# Patient Record
Sex: FEMALE | Race: WHITE
[De-identification: names, ages, dates, MRNs, and addresses within clinical notes are randomized per-mention and may not be internally consistent; named-entity substitution may affect disease eponyms.]

---

## 2020-08-03 ENCOUNTER — HOSPITAL ENCOUNTER (OUTPATIENT)
Dept: HOSPITAL 62 - LC | Age: 20
Discharge: HOME | End: 2020-08-03
Attending: OBSTETRICS & GYNECOLOGY
Payer: OTHER GOVERNMENT

## 2020-08-03 DIAGNOSIS — Z34.83: ICD-10-CM

## 2020-08-03 DIAGNOSIS — Z3A.35: Primary | ICD-10-CM

## 2020-08-03 LAB
APPEARANCE UR: (no result)
APTT PPP: YELLOW S
BARBITURATES UR QL SCN: (no result)
BILIRUB UR QL STRIP: NEGATIVE
GLUCOSE UR STRIP-MCNC: NEGATIVE MG/DL
KETONES UR STRIP-MCNC: NEGATIVE MG/DL
METHADONE UR QL SCN: NEGATIVE
NITRITE UR QL STRIP: NEGATIVE
PCP UR QL SCN: NEGATIVE
PH UR STRIP: 6 [PH] (ref 5–9)
PROT UR STRIP-MCNC: 30 MG/DL
SP GR UR STRIP: 1.02
URINE AMPHETAMINES SCREEN: NEGATIVE
URINE BENZODIAZEPINES SCREEN: NEGATIVE
URINE COCAINE SCREEN: NEGATIVE
URINE MARIJUANA (THC) SCREEN: NEGATIVE
UROBILINOGEN UR-MCNC: NEGATIVE MG/DL (ref ?–2)

## 2020-08-03 PROCEDURE — 59025 FETAL NON-STRESS TEST: CPT

## 2020-08-03 PROCEDURE — 81005 URINALYSIS: CPT

## 2020-08-03 PROCEDURE — 80307 DRUG TEST PRSMV CHEM ANLYZR: CPT

## 2020-08-03 PROCEDURE — 80345 DRUG SCREENING BARBITURATES: CPT

## 2020-08-03 PROCEDURE — G0480 DRUG TEST DEF 1-7 CLASSES: HCPCS

## 2020-08-22 ENCOUNTER — HOSPITAL ENCOUNTER (OUTPATIENT)
Dept: HOSPITAL 62 - LC | Age: 20
Discharge: HOME | End: 2020-08-22
Attending: OBSTETRICS & GYNECOLOGY
Payer: OTHER GOVERNMENT

## 2020-08-22 DIAGNOSIS — O47.1: Primary | ICD-10-CM

## 2020-08-22 DIAGNOSIS — Z3A.37: ICD-10-CM

## 2020-08-22 LAB
APPEARANCE UR: CLEAR
APTT PPP: YELLOW S
BARBITURATES UR QL SCN: (no result)
BILIRUB UR QL STRIP: NEGATIVE
GLUCOSE UR STRIP-MCNC: NEGATIVE MG/DL
KETONES UR STRIP-MCNC: NEGATIVE MG/DL
METHADONE UR QL SCN: NEGATIVE
NITRITE UR QL STRIP: NEGATIVE
PCP UR QL SCN: NEGATIVE
PH UR STRIP: 7 [PH] (ref 5–9)
PROT UR STRIP-MCNC: NEGATIVE MG/DL
SP GR UR STRIP: 1.01
URINE AMPHETAMINES SCREEN: NEGATIVE
URINE BENZODIAZEPINES SCREEN: NEGATIVE
URINE COCAINE SCREEN: NEGATIVE
URINE MARIJUANA (THC) SCREEN: NEGATIVE
UROBILINOGEN UR-MCNC: NEGATIVE MG/DL (ref ?–2)

## 2020-08-22 PROCEDURE — 81005 URINALYSIS: CPT

## 2020-08-22 PROCEDURE — 84112 EVAL AMNIOTIC FLUID PROTEIN: CPT

## 2020-08-22 PROCEDURE — 59025 FETAL NON-STRESS TEST: CPT

## 2020-08-22 PROCEDURE — 80307 DRUG TEST PRSMV CHEM ANLYZR: CPT

## 2020-08-22 NOTE — NON STRESS TEST REPORT
=================================================================

Non Stress Test

=================================================================

Datetime Report Generated by CPN: 08/22/2020 15:43

   

   

=================================================================

DEMOGRAPHIC

=================================================================

   

EGA NST:  37.6

   

=================================================================

INDICATION

=================================================================

   

Indication for Study (NST) Other:  IUP at 37.6; Not if Labor

   

=================================================================

VITAL SIGNS

=================================================================

   

Temperature - NST:  98.5

Pulse - NST:  92

RESP - NST:  18

NBPSYS NST:  126

NBPDIA NST:  72

   

=================================================================

MONITORING

=================================================================

   

Monitor Explained:  Monitor Explained; Test Explained; Patient

   Verbalized Understanding

Time on Monitor:  08/22/2020 14:50

Time off Monitor:  08/22/2020 15:30

NST Duration:  40

   

=================================================================

NST INTERVENTIONS

=================================================================

   

NST Interventions:  PO Hydration

Physician Notified NST:  Dr. Wild

   

=================================================================

BABY A

=================================================================

   

Fetal Movement :  Present

Contraction Frequency :  0

FHR Baseline :  135

Accelerations :  15X15

Decelerations :  None

Variability :  Moderate 6-25bpm

NST Review:  Meets Criteria for Reactive NST

NST Review and Verified By :  FRANK Norwood RN

NST Results:  Reactive

   

=================================================================

NST REPORT

=================================================================

   

Report Trigger:  Send Report

## 2020-08-22 NOTE — NON STRESS TEST REPORT
=================================================================

Non Stress Test

=================================================================

Datetime Report Generated by CPN: 08/22/2020 14:33

   

   

=================================================================

DEMOGRAPHIC

=================================================================

   

EGA NST:  35.1

   

=================================================================

INDICATION

=================================================================

   

Indication for Study (NST) Other:  LC

   

=================================================================

MONITORING

=================================================================

   

Monitor Explained:  Monitor Explained; Test Explained; Patient

   Verbalized Understanding

Time on Monitor:  08/03/2020 23:10

Time off Monitor:  08/03/2020 23:35

NST Duration:  25

   

=================================================================

NST INTERVENTIONS

=================================================================

   

NST Interventions:  PO Hydration

Physician Notified NST:  Dr. Pierce

BABY A:  [id number]

   

=================================================================

BABY A

=================================================================

   

Fetal Movement :  Present

Contraction Frequency :  none

FHR Baseline :  125

Accelerations :  15X15

Decelerations :  None

Variability :  Moderate 6-25bpm

NST Review:  Meets Criteria for Reactive NST

NST Review and Verified By :  JUSTIN Vásquezavabrendan RN

NST Results:  Reactive

   

=================================================================

NST REPORT

=================================================================

   

Report Trigger:  Send Report

## 2020-08-24 ENCOUNTER — HOSPITAL ENCOUNTER (OUTPATIENT)
Dept: HOSPITAL 62 - LC | Age: 20
Discharge: HOME | End: 2020-08-24
Attending: OBSTETRICS & GYNECOLOGY
Payer: OTHER GOVERNMENT

## 2020-08-24 DIAGNOSIS — Z3A.38: ICD-10-CM

## 2020-08-24 DIAGNOSIS — O47.1: Primary | ICD-10-CM

## 2020-08-24 LAB
APPEARANCE UR: (no result)
APTT PPP: YELLOW S
BARBITURATES UR QL SCN: (no result)
BILIRUB UR QL STRIP: NEGATIVE
GLUCOSE UR STRIP-MCNC: NEGATIVE MG/DL
KETONES UR STRIP-MCNC: NEGATIVE MG/DL
METHADONE UR QL SCN: NEGATIVE
NITRITE UR QL STRIP: NEGATIVE
PCP UR QL SCN: NEGATIVE
PH UR STRIP: 6 [PH] (ref 5–9)
PROT UR STRIP-MCNC: NEGATIVE MG/DL
SP GR UR STRIP: 1.02
URINE AMPHETAMINES SCREEN: NEGATIVE
URINE BENZODIAZEPINES SCREEN: NEGATIVE
URINE COCAINE SCREEN: NEGATIVE
URINE MARIJUANA (THC) SCREEN: NEGATIVE
UROBILINOGEN UR-MCNC: NEGATIVE MG/DL (ref ?–2)

## 2020-08-24 PROCEDURE — 80307 DRUG TEST PRSMV CHEM ANLYZR: CPT

## 2020-08-24 PROCEDURE — 81005 URINALYSIS: CPT

## 2020-08-24 PROCEDURE — 59025 FETAL NON-STRESS TEST: CPT

## 2020-08-24 NOTE — NON STRESS TEST REPORT
=================================================================

Non Stress Test

=================================================================

Datetime Report Generated by CPN: 08/24/2020 21:26

   

   

=================================================================

DEMOGRAPHIC

=================================================================

   

EGA NST:  38.1

   

=================================================================

INDICATION

=================================================================

   

Indication for Study (NST) Other:  lc

   

=================================================================

VITAL SIGNS

=================================================================

   

Temperature - NST:  97.1

Pulse - NST:  86

RESP - NST:  17

NBPSYS NST:  119

NBPDIA NST:  77

   

=================================================================

MONITORING

=================================================================

   

Monitor Explained:  Monitor Explained; Test Explained; Patient

   Verbalized Understanding

Time on Monitor:  08/24/2020 19:15

Time off Monitor:  08/24/2020 20:50

NST Duration:  95

   

=================================================================

NST INTERVENTIONS

=================================================================

   

NST Interventions:  PO Hydration; Reposition Patient

Physician Notified NST:  Dr Pierce

BABY A:  [id number]

   

=================================================================

BABY A

=================================================================

   

Fetal Movement :  Present

Contraction Frequency :  rare

FHR Baseline :  120

Accelerations :  15X15

Decelerations :  None

Variability :  Moderate 6-25bpm

NST Review:  Meets Criteria for Reactive NST

NST Review and Verified By :  ELHAM Null RN

NST Results:  Reactive

   

=================================================================

NST REPORT

=================================================================

   

Report Trigger:  Send Report

## 2020-08-28 ENCOUNTER — HOSPITAL ENCOUNTER (OUTPATIENT)
Dept: HOSPITAL 62 - LC | Age: 20
Discharge: HOME | End: 2020-08-28
Attending: OBSTETRICS & GYNECOLOGY
Payer: OTHER GOVERNMENT

## 2020-08-28 DIAGNOSIS — O36.8130: ICD-10-CM

## 2020-08-28 DIAGNOSIS — Z3A.38: ICD-10-CM

## 2020-08-28 DIAGNOSIS — O47.1: Primary | ICD-10-CM

## 2020-08-28 LAB
APPEARANCE UR: CLEAR
APTT PPP: (no result) S
BARBITURATES UR QL SCN: (no result)
BILIRUB UR QL STRIP: NEGATIVE
GLUCOSE UR STRIP-MCNC: NEGATIVE MG/DL
KETONES UR STRIP-MCNC: NEGATIVE MG/DL
METHADONE UR QL SCN: NEGATIVE
NITRITE UR QL STRIP: NEGATIVE
PCP UR QL SCN: NEGATIVE
PH UR STRIP: 7 [PH] (ref 5–9)
PROT UR STRIP-MCNC: NEGATIVE MG/DL
SP GR UR STRIP: 1.01
URINE AMPHETAMINES SCREEN: NEGATIVE
URINE BENZODIAZEPINES SCREEN: NEGATIVE
URINE COCAINE SCREEN: NEGATIVE
URINE MARIJUANA (THC) SCREEN: NEGATIVE
UROBILINOGEN UR-MCNC: NEGATIVE MG/DL (ref ?–2)

## 2020-08-28 PROCEDURE — 81005 URINALYSIS: CPT

## 2020-08-28 PROCEDURE — 59025 FETAL NON-STRESS TEST: CPT

## 2020-08-28 PROCEDURE — 80307 DRUG TEST PRSMV CHEM ANLYZR: CPT

## 2020-09-07 ENCOUNTER — HOSPITAL ENCOUNTER (OUTPATIENT)
Dept: HOSPITAL 62 - LC | Age: 20
Discharge: HOME | End: 2020-09-07
Attending: OBSTETRICS & GYNECOLOGY
Payer: OTHER GOVERNMENT

## 2020-09-07 ENCOUNTER — HOSPITAL ENCOUNTER (INPATIENT)
Dept: HOSPITAL 62 - LC | Age: 20
LOS: 3 days | Discharge: HOME | End: 2020-09-10
Attending: OBSTETRICS & GYNECOLOGY | Admitting: OBSTETRICS & GYNECOLOGY
Payer: OTHER GOVERNMENT

## 2020-09-07 DIAGNOSIS — F32.9: ICD-10-CM

## 2020-09-07 DIAGNOSIS — O47.1: Primary | ICD-10-CM

## 2020-09-07 DIAGNOSIS — Z3A.40: ICD-10-CM

## 2020-09-07 DIAGNOSIS — F41.9: ICD-10-CM

## 2020-09-07 DIAGNOSIS — Z20.828: ICD-10-CM

## 2020-09-07 LAB
ADD MANUAL DIFF: NO
APPEARANCE UR: CLEAR
APPEARANCE UR: CLEAR
APTT PPP: YELLOW S
APTT PPP: YELLOW S
BARBITURATES UR QL SCN: (no result)
BARBITURATES UR QL SCN: (no result)
BASOPHILS # BLD AUTO: 0.1 10^3/UL (ref 0–0.2)
BASOPHILS NFR BLD AUTO: 0.7 % (ref 0–2)
BILIRUB UR QL STRIP: NEGATIVE
BILIRUB UR QL STRIP: NEGATIVE
EOSINOPHIL # BLD AUTO: 0.1 10^3/UL (ref 0–0.6)
EOSINOPHIL NFR BLD AUTO: 0.5 % (ref 0–6)
ERYTHROCYTE [DISTWIDTH] IN BLOOD BY AUTOMATED COUNT: 14 % (ref 11.5–14)
GLUCOSE UR STRIP-MCNC: NEGATIVE MG/DL
GLUCOSE UR STRIP-MCNC: NEGATIVE MG/DL
HCT VFR BLD CALC: 35.2 % (ref 36–47)
HGB BLD-MCNC: 12.2 G/DL (ref 12–15.5)
KETONES UR STRIP-MCNC: NEGATIVE MG/DL
KETONES UR STRIP-MCNC: NEGATIVE MG/DL
LYMPHOCYTES # BLD AUTO: 1.3 10^3/UL (ref 0.5–4.7)
LYMPHOCYTES NFR BLD AUTO: 10.3 % (ref 13–45)
MCH RBC QN AUTO: 28.2 PG (ref 27–33.4)
MCHC RBC AUTO-ENTMCNC: 34.6 G/DL (ref 32–36)
MCV RBC AUTO: 82 FL (ref 80–97)
METHADONE UR QL SCN: NEGATIVE
METHADONE UR QL SCN: NEGATIVE
MONOCYTES # BLD AUTO: 1.1 10^3/UL (ref 0.1–1.4)
MONOCYTES NFR BLD AUTO: 8.4 % (ref 3–13)
NEUTROPHILS # BLD AUTO: 10.1 10^3/UL (ref 1.7–8.2)
NEUTS SEG NFR BLD AUTO: 80.1 % (ref 42–78)
NITRITE UR QL STRIP: NEGATIVE
NITRITE UR QL STRIP: NEGATIVE
PCP UR QL SCN: NEGATIVE
PCP UR QL SCN: NEGATIVE
PH UR STRIP: 7 [PH] (ref 5–9)
PH UR STRIP: 7 [PH] (ref 5–9)
PLATELET # BLD: 231 10^3/UL (ref 150–450)
PROT UR STRIP-MCNC: NEGATIVE MG/DL
PROT UR STRIP-MCNC: NEGATIVE MG/DL
RBC # BLD AUTO: 4.31 10^6/UL (ref 3.72–5.28)
SP GR UR STRIP: 1.01
SP GR UR STRIP: 1.01
TOTAL CELLS COUNTED % (AUTO): 100 %
URINE AMPHETAMINES SCREEN: NEGATIVE
URINE AMPHETAMINES SCREEN: NEGATIVE
URINE BENZODIAZEPINES SCREEN: NEGATIVE
URINE BENZODIAZEPINES SCREEN: NEGATIVE
URINE COCAINE SCREEN: NEGATIVE
URINE COCAINE SCREEN: NEGATIVE
URINE MARIJUANA (THC) SCREEN: NEGATIVE
URINE MARIJUANA (THC) SCREEN: NEGATIVE
UROBILINOGEN UR-MCNC: NEGATIVE MG/DL (ref ?–2)
UROBILINOGEN UR-MCNC: NEGATIVE MG/DL (ref ?–2)
WBC # BLD AUTO: 12.6 10^3/UL (ref 4–10.5)

## 2020-09-07 PROCEDURE — 86901 BLOOD TYPING SEROLOGIC RH(D): CPT

## 2020-09-07 PROCEDURE — 81005 URINALYSIS: CPT

## 2020-09-07 PROCEDURE — 86900 BLOOD TYPING SEROLOGIC ABO: CPT

## 2020-09-07 PROCEDURE — 85027 COMPLETE CBC AUTOMATED: CPT

## 2020-09-07 PROCEDURE — 86850 RBC ANTIBODY SCREEN: CPT

## 2020-09-07 PROCEDURE — 36415 COLL VENOUS BLD VENIPUNCTURE: CPT

## 2020-09-07 PROCEDURE — 85025 COMPLETE CBC W/AUTO DIFF WBC: CPT

## 2020-09-07 PROCEDURE — 80307 DRUG TEST PRSMV CHEM ANLYZR: CPT

## 2020-09-07 PROCEDURE — 86592 SYPHILIS TEST NON-TREP QUAL: CPT

## 2020-09-07 PROCEDURE — 84112 EVAL AMNIOTIC FLUID PROTEIN: CPT

## 2020-09-07 PROCEDURE — 59025 FETAL NON-STRESS TEST: CPT

## 2020-09-07 NOTE — NON STRESS TEST REPORT
=================================================================

Non Stress Test

=================================================================

Datetime Report Generated by CPN: 09/07/2020 09:04

   

   

=================================================================

DEMOGRAPHIC

=================================================================

   

EGA NST:  40.1

   

=================================================================

INDICATION

=================================================================

   

Indication for Study (NST) Other:  false labor

   

=================================================================

MONITORING

=================================================================

   

Monitor Explained:  Monitor Explained; Test Explained; Patient

   Verbalized Understanding

Time on Monitor:  09/07/2020 07:46

Time off Monitor:  09/07/2020 08:54

   

=================================================================

NST INTERVENTIONS

=================================================================

   

NST Interventions:  PO Hydration; Reposition Patient

Physician Notified NST:  Dr. Torres

   

=================================================================

BABY A

=================================================================

   

Fetal Movement :  Present

Contraction Frequency :  none

FHR Baseline :  135

Accelerations :  15X15

Decelerations :  None

Variability :  Moderate 6-25bpm

NST Review:  Meets Criteria for Reactive NST

NST Review and Verified By :  RICK Wang

NST Results:  Reactive

   

=================================================================

NST REPORT

=================================================================

   

Report Trigger:  Send Report

## 2020-09-07 NOTE — NON STRESS TEST REPORT
=================================================================

Non Stress Test

=================================================================

Datetime Report Generated by CPN: 09/07/2020 07:32

   

   

=================================================================

DEMOGRAPHIC

=================================================================

   

EGA NST:  38.5

   

=================================================================

INDICATION

=================================================================

   

Indication for Study (NST) Other:  gestational age >32 weeks

   

=================================================================

VITAL SIGNS

=================================================================

   

Temperature - NST:  98.3

Pulse - NST:  73

RESP - NST:  17

NBPSYS NST:  120

NBPDIA NST:  77

   

=================================================================

MONITORING

=================================================================

   

Monitor Explained:  Monitor Explained; Test Explained; Patient

   Verbalized Understanding

Time on Monitor:  08/28/2020 20:37

Time off Monitor:  08/28/2020 22:21

   

=================================================================

NST INTERVENTIONS

=================================================================

   

NST Interventions:  PO Hydration; Reposition Patient

Physician Notified NST:  dr harrison

BABY A:  [id number]

   

=================================================================

BABY A

=================================================================

   

Fetal Movement :  Present

Contraction Frequency :  irregular

FHR Baseline :  125

Accelerations :  15X15

Decelerations :  None

Variability :  Moderate 6-25bpm

NST Review:  Meets Criteria for Reactive NST

NST Review and Verified By :  RICK BUSTAMANTE Results:  Reactive

   

=================================================================

NST REPORT

=================================================================

   

Report Trigger:  Send Report

## 2020-09-07 NOTE — ADMISSION PHYSICAL
=================================================================



=================================================================

Datetime Report Generated by CPN: 2020 17:11

   

   

=================================================================

CURRENT ADMISSION

=================================================================

   

Chief Complaint:  Uterine Contractions; Suspected Ruptured Membranes

Indication for Induction:  Not Applicable

Admit Impression :  Term, Intrauterine Pregnancy

Admit Plan:  Admit to Unit; Initiate Labor Protocol

   

=================================================================

ALLERGIES

=================================================================

   

Medication Allergies:  No

Medication Allergies:  No Known Allergies (2020)

Latex:  No Latex Allergies

   

=================================================================

OBSTETRICAL HISTORY

=================================================================

   

EDC:  2020 00:00

:  3

Para:  0

Term:  0

:  0

SAB:  2

IAB:  0

Ectopic:  0

Livin

Cesareans:  0

VBACs:  0

Multiple Births:  0

Gestational Diabetes:  No

Rh Sensitization:  No

Incompetent Cervix:  No

REMIGIO:  No

Infertility:  No

ART Treatment:  No

Uterine Anomaly:  No

IUGR:  No

Hx Previous C/S:  No

Macrosomia:  No

Hx Loss/Stillborn:  No

PIH:  No

Hx  Death:  No

Placenta Previa/Abruption:  No

Depression/PP Depression:  Yes

PTL/PROM:  No

Post Partum Hemorrhage:  No

Current Pregnancy Procedures:  Ultrasound; NST

Obstetrical History Comments:  G1-SAB

   G2-SAB

   G3-Current

   

=================================================================

***SEE PRENATAL RECORDS***

=================================================================

   

Alcohol:  No

Marijuana :  No

Cocaine:  No

Other Illicit Drugs:  No

Cigarettes:  Never Smoker. [id number]

   

=================================================================

MEDICAL HISTORY

=================================================================

   

Diabetes:  No

Blood Transfusion:  No

Pulmonary Disease (Asthma, TB):  No

Breast Disease:  No

Hypertension:  No

Gyn Surgery:  No

Heart Disease:  No

Hosp/Surgery:  Yes

Autoimmune Disorder:  No

Anesthetic Complications:  No

Kidney Disease:  Yes

Abnormal Pap Smear:  No

Neuro/Epilepsy:  No

Psychiatric Disorders:  Yes

Other Medical Diseases:  No

Hepatitis/Liver Disease:  No

Significant Family History:  No

Varicosities/Phlebitis:  No

Trauma/Violence :  No

Thyroid Dysfunction:  No

Medical History Comments:  Right foot surgery 2019; Depression/Anxiety

   

=================================================================

INFECTIOUS HISTORY

=================================================================

   

Gonorrhea:  No

Genital Herpes:  No

Chlamydia:  No

Tuberculosis:  No

Syphilis:  No

Hepatitis:  No

HIV/AIDS Exposure:  No

Rash or Viral Illness:  No

HPV:  No

   

=================================================================

PHYSICAL EXAM

=================================================================

   

General:  Normal

HEENT:  Normal

Neurologic:  Normal

Thyroid:  Normal

Heart:  Normal

Lungs:  Normal

Breast:  Deferred

Back:  Normal

Abdomen:  Normal

Genitourinary Exam:  Normal

Extremities:  Normal

DTRs:  Normal

Pelvic Type:  Adequate

   

=================================================================

FETUS A

=================================================================

   

EGA:  40.1

   

=================================================================

PLANS FOR LABOR AND DELIVERY

=================================================================

   

Labor and Delivery:  None

Pain Management:  Epidural

Feeding Preference:  Breast

Circumcision:  Yes

   

=================================================================

INFORMED CONSENT

=================================================================

   

Signature:  Electronically signed by Michael Torres MD ([facility]) on

   2020 at 17:11  with User ID: [email or web address]

## 2020-09-08 PROCEDURE — 0UQGXZZ REPAIR VAGINA, EXTERNAL APPROACH: ICD-10-PCS | Performed by: OBSTETRICS & GYNECOLOGY

## 2020-09-08 RX ADMIN — DOCUSATE SODIUM SCH MG: 100 CAPSULE, LIQUID FILLED ORAL at 09:26

## 2020-09-08 RX ADMIN — IBUPROFEN SCH MG: 800 TABLET, FILM COATED ORAL at 21:19

## 2020-09-08 RX ADMIN — FAMOTIDINE SCH MG: 20 TABLET, FILM COATED ORAL at 09:26

## 2020-09-08 RX ADMIN — ACETAMINOPHEN AND CODEINE PHOSPHATE PRN EACH: 300; 30 TABLET ORAL at 04:06

## 2020-09-08 RX ADMIN — FERROUS SULFATE TAB 325 MG (65 MG ELEMENTAL FE) SCH MG: 325 (65 FE) TAB at 18:14

## 2020-09-08 RX ADMIN — SENNOSIDES, DOCUSATE SODIUM SCH EACH: 50; 8.6 TABLET, FILM COATED ORAL at 09:26

## 2020-09-08 RX ADMIN — FAMOTIDINE SCH MG: 20 TABLET, FILM COATED ORAL at 21:19

## 2020-09-08 RX ADMIN — Medication SCH CAP: at 09:26

## 2020-09-08 RX ADMIN — FERROUS SULFATE TAB 325 MG (65 MG ELEMENTAL FE) SCH MG: 325 (65 FE) TAB at 09:26

## 2020-09-08 RX ADMIN — DOCUSATE SODIUM SCH MG: 100 CAPSULE, LIQUID FILLED ORAL at 18:14

## 2020-09-08 RX ADMIN — IBUPROFEN SCH MG: 800 TABLET, FILM COATED ORAL at 06:18

## 2020-09-08 RX ADMIN — IBUPROFEN SCH MG: 800 TABLET, FILM COATED ORAL at 13:08

## 2020-09-08 RX ADMIN — ACETAMINOPHEN AND CODEINE PHOSPHATE PRN EACH: 300; 30 TABLET ORAL at 18:14

## 2020-09-08 NOTE — DELIVERY SUMMARY
=================================================================

Del Sum A-C

=================================================================

Datetime Report Generated by CPN: 2020 04:37

   

   

=================================================================

DELIVERY PERSONNEL

=================================================================

   

DELIVERY PERSONNEL:  [id number]

Delivery Doctor::  Michael Torres MD

Labor and Delivery Nurse::  Kalyani Chery, RNC

Labor and Delivery Nurse::  Mindy Espinoza RN

Nursery Nurse::  Maria Dambrocio Merino RN

   

=================================================================

MATERNAL INFORMATION

=================================================================

   

Delivery Anesthesia:  Epidural

Medications After Delivery:  Pitocin 30 Units in 500ml NS/D5W

Delivery QBL Comment:  100 mL

Maternal Complications:  None

   

=================================================================

LABOR SUMMARY

=================================================================

   

EDC:  2020 00:00

No. Babies in Womb:  1

 Attempted:  No

Labor Anesthesia:  Epidural

   

=================================================================

LABOR INFORMATION

=================================================================

   

Reason for Induction:  Not Applicable

Onset of Labor:  2020 21:02

Complete Dilatation:  2020 01:37

Oxytocin:  N/A

Group B Beta Strep:  negative

Antibiotics # of Doses:  n/a

Name of Antibiotic Given:  n/a

Steroids Given:  None

Reason Steroids Not Administered:  Not Applicable

   

=================================================================

MEMBRANES

=================================================================

   

Membranes Rupture Method:  Spontaneous

Rupture of Membranes:  2020 03:00

Length of Rupture (hr):  24.32

Amniotic Fluid Color:  Moderate Meconium

Amniotic Fluid Amount:  Moderate

Amniotic Fluid Odor:  Normal

   

=================================================================

STAGES OF LABOR

=================================================================

   

Stage 1 hr:  4

Stage 1 min:  35

Stage 2 hr:  1

Stage 2 min:  42

Stage 3 hr:  0

Stage 3 min:  3

Total Time in Labor hr:  6

Total Time in Labor min:  20

   

=================================================================

VAGINAL DELIVERY

=================================================================

   

Episiotomy:  None

Laceration #1:  Vaginal

Laceration Extension #1:  N/A

Laceration Repair:  Yes

Sponge Count Correct:  Yes

Sharps Count Correct:  Yes

   

=================================================================

BABY A INFORMATION

=================================================================

   

Infant Delivery Date/Time:  2020 03:19

Method of Delivery:  Vaginal

Nurse Controlled Delivery:  No

Born in Route :  No

:  N/A

Forceps:  N/A

Vacuum Extraction:  N/A

Shoulder Dystocia :  No

   

=================================================================

PRESENTATION/POSITION BABY A

=================================================================

   

Presentation:  Cephalic

Cephalic Presentation:  Vertex

Vertex Position:  Left Occipital Anterior

Breech Presentation:  N/A

   

=================================================================

PLACENTA INFORMATION BABY A

=================================================================

   

Placenta Delivery Time :  2020 03:22

Placenta Method of Delivery:  Spontaneous

Placenta Status:  Delivered

   

=================================================================

APGAR SCORES BABY A

=================================================================

   

Heart Rate 1 min:  >100 bpm

Resp Effort 1 min:  Good Cry

Reflex Irritability 1 min:  Cough or Sneeze or Pulls Away

Muscle Tone 1 min:  Active Motion

Color 1 min:  Blue/Pale

APGAR SCORE 1 MIN:  8

Heart Rate 5 min:  >100 bpm

Resp Effort 5 min:  Good Cry

Reflex Irritability 5 min:  Cough or Sneeze or Pulls Away

Muscle Tone 5 min:  Active Motion

Color 5 min:  Body Pink, Extremities Blue

APGAR SCORE 5 MIN:  9

   

=================================================================

INFANT INFORMATION BABY A

=================================================================

   

Gestational Age at Delivery:  40.2

Gestational Status:  Full Term- 39- 40.6 Weeks

Infant Outcome :  Liveborn

Infant Condition :  Stable

Infant Sex:  Male

   

=================================================================

IDENTIFICATION BABY A

=================================================================

   

Infant Verification Date/Time:  2020 03:29

ID Band Number:  [id number]

Mother's Name Verified:  Yes

Infant Medical Record Number:  [id number]

RN Verifying Infant:  ELHAM Null RN

Additional Verifying Personnel:  DARREL Haynes

   

=================================================================

WEIGHT/LENGTH BABY A

=================================================================

   

Infant Birthweight (gm):  3260

Infant Weight (lb):  7

Infant Weight (oz):  3

Infant Length (in):  19.00

Infant Length (cm):  48.26

   

=================================================================

CORD INFORMATION BABY A

=================================================================

   

No. Cord Vessels:  3

Nuchal Cord :  Around Neck x1, Loose

Cord Blood Taken:  Yes-For Eval (Mom's Blood Type - or O+)

Infant Suction:  Mouth; Nose

   

=================================================================

ASSESSMENT BABY A

=================================================================

   

Infant Complications:  None

Physical Findings at Delivery:  Within Normal Limits

Skin to Skin:  Yes

Transferred To:  Remains with Mother

   

=================================================================

BABY B INFORMATION

=================================================================

   

 :  N/A

   

=================================================================

SIGNATURES

=================================================================

   

Signature:  Electronically signed by Michael Torres MD ([email or web address]) on

   2020 at 03:28  with User ID: [email or web address]

## 2020-09-08 NOTE — BIRTH CERTIFICATE DATA
=================================================================

Birth Cert Data

=================================================================

Datetime Report Generated by COURTNEY: 2020 04:37

   

   

=================================================================

BIRTH CERTIFICATE DATA

=================================================================

   

 47a. Prenatal Care:  Yes    (2020 00:25:Veronica Chinchilla RN)

 47b. Date of First Visit:  2020 00:00    (2020 00:25:Veronica Chinchilla RN)

 47c. Date of Last Visit:  2020 00:00    (2020 00:25:Veronica Chinchilla RN)

 47d. Number of Prenatal Visits:  10    (2020 00:25:Veronica Chinchilla RN)

 48a. Number of Prev Live Births:  0    (2020 00:25:Veronica Chinchilla RN)

 48b. Now Livin    (2020 00:25:Veronica Chinchilla RN)

 48c. Live Births Now Dead:  0    (2020 00:25: system process)

 48e. Pregnancy Losses:  2    (2020 00:25:Veronica Chinchilla RN)

 48f. Date of Last Preg Loss:  2019 00:00    (2020

   00:25:April Renée RN)

   

=================================================================

RISK FACTORS IN THIS PREGNANCY

=================================================================

   

 49a. Diabetes:  No    (2020 00:25:April Renée RN)

 49b. Hypertension:  No    (2020 00:25:April Renée RN)

 49c. Previous  Births:  0    (2020 00:25:Veronica Chinchilla RN)

 49d. Stillborns:  No    (2020 00:25:April Renée RN)

 49d. IUGR:  No    (2020 00:25:April Renée RN)

 49e. Infertility Treatment:  No    (2020 00:25:April Renée RN)

 49f. Previous Cesareans:  0    (2020 00:25:April Renée RN)

   

=================================================================

Mother's Height

=================================================================

   

 50b. Height Inches:  69    (2020 16:20:QS system process)

   

=================================================================

Mother's Weight 

=================================================================

   

 51a. Pre-Pregnancy Weight (lbs):  220    (2020 00:25:April

   RICK Chinchilla)

 51b. Weight at Delivery (lbs):  249    (2020 07:47:QS system

   process)

 52. Dt Last Normal Menses Began:  2019 00:00    (2020

   00:25:April RICK Chinchilla)

   

=================================================================

Infections Present/Treated

=================================================================

   

 53a. Gonorrhea:  No    (2020 00:25:April RICK Chinchilla)

 Results this Hospital Visit :  Negative    (2020 00:25:April

   RICK Chinchilla)

 53b. Syphilis:  No    (2020 00:25:April RICK Chinchilla)

 53c. Chlamydia:  No    (2020 00:25:April RICK Chinchilla)

 Results this Hospital Visit:  Negative    (2020 00:25:April

   RICK Chinchilla)

 53d. Hepatitis B:  No    (2020 00:25:April RICK Chinchilla)

 Results this Hospital Visit:  Negative    (2020 00:25:Veronica Chinchilla RN)

 53e. Hepatitis C:  Negative    (2020 00:25:Veronica Chinchilla RN)

 53h. Mother Tested for HBsAG:  Yes    (2020 00:25:Veronica Chinchilla RN)

 53i. Date Tested:  2020 00:00    (2020 00:25:Veronica Chinchilla RN)

 53j. Test Result:  Negative    (2020 00:25:Veronica Chinchilla RN)

   

=================================================================

Obstetric Procedures 

=================================================================

   

 54a, b, c. Obstetric Procedures:  Ultrasound; NST    (2020

   00:25:Veronica Chinchilla RN)

   

=================================================================

Cigarette Smoking 

=================================================================

   

 55a. 3 Months Before Preg - Ci    (2020 00:25:Veronica Chinchilla RN)

 55b. 1st Trimester of Preg- Ci    (2020 00:25:Veronica Chinchilla RN)

 55c. 2nd Trimester of Preg- Ci    (2020 00:25:Veronica Chinchilla RN)

 55d. 3rd Trimester of Preg- Ci    (2020 00:25:Veronica Chinchilla RN)

   

=================================================================

Onset of Labor

=================================================================

   

 56a. PROM >12 Hrs:  24.32    (2020 00:25:QS system process)

 56b. Precipitous Labor <3 Hrs:  6    (2020 00:25:QS system

   process)

 56c. Prolonged Labor > 20 Hrs:  6    (2020 00:25:QS system

   process)

   

=================================================================



=================================================================

   

 57a. Induction of Labor:  N/A    (2020 00:25:Mindy Espinoza RN)

 57c. Non-Vertex Presentation A:  Vertex    (2020 00:25:Mindy Espinoza RN)

 57d. Steroids - Fetal Lung Mat:  None    (2020 00:25:April

   RICK Chinchilla)

 57d. Steroids - Fetal Lung Mat:  Not Applicable    (2020

   00:25:April RICK Chinchilla)

 57f. Mat Chorio or Temp >100.4:  98.7    (2020 00:25:Mindy Espinoza RN)

 57g. Moderate/Heavy Meconium:  Moderate Meconium    (2020

   19:10:Veronica Chinchilla RN)

 57i. Epidural/Spinal Anesthesia:  Epidural    (2020

   00:25:Mindy Espinoza RN)

   

=================================================================

Method of Delivery

=================================================================

   

 58a. Forceps - Unsuccessful A:  N/A    (2020 00:25:Mindy Espinoza RN)

 58b. Vacuum - Unsuccessful A:  N/A    (2020 00:25:Mindy Espinoza RN)

   

=================================================================

58c. Presentation at Birth

=================================================================

   

 58c. Presentation at Birth - A :  Vertex    (2020 00:25:Mindy Espinoza RN)

 58c. Presentation at Birth - A :  N/A    (2020 00:25:Mindy Espinoza RN)

 58c. Presentation at Birth - A :  Cephalic    (2020

   22:22:Mindy Espinoza RN)

   

=================================================================

Final Route and Method of Del 

=================================================================

   

 58d. Baby A Route/Delivery:  Vaginal    (2020 03:19:Mindy Espinoza RN)

 58e. Trial of Labor Attempted:  No    (2020 00:25:April RICK Chinchilla)

 58e. Trial of Labor Attempted A:  N/A    (2020 00:25:April

   RICK Chinchilla)

 58e. Trial of Labor Attempted B:  N/A    (2020 00:25:April

   RICK Chinchilla)

   

=================================================================

Maternal Morbidity

=================================================================

   

 59b. 3rd or 4th Degree Lacs:  Vaginal    (2020 00:25:Mindy Espinoza RN)

   

=================================================================



=================================================================

   

 Birthweight Baby A:  3260    (2020 00:25:Mirta Merino RN)

 60a. Pounds :  7    (2020 00:25:QS system process)

 60b. Ounces:  3    (2020 00:25:QS system process)

   

=================================================================

61. GA at Delivery 

=================================================================

   

 Baby A:  40.2    (2020 00:25:Mindy Espinoza RN)

:  Full Term- 39- 40.6 Weeks    (2020 00:25:QS system process)

   

=================================================================

62a. APGAR 5 Minute

=================================================================

   

 Baby A:  9    (2020 00:25:QS system process)

## 2020-09-09 LAB
ERYTHROCYTE [DISTWIDTH] IN BLOOD BY AUTOMATED COUNT: 14.2 % (ref 11.5–14)
HCT VFR BLD CALC: 36.6 % (ref 36–47)
HGB BLD-MCNC: 12.5 G/DL (ref 12–15.5)
MCH RBC QN AUTO: 28 PG (ref 27–33.4)
MCHC RBC AUTO-ENTMCNC: 34.2 G/DL (ref 32–36)
MCV RBC AUTO: 82 FL (ref 80–97)
PLATELET # BLD: 221 10^3/UL (ref 150–450)
RBC # BLD AUTO: 4.46 10^6/UL (ref 3.72–5.28)
WBC # BLD AUTO: 13.2 10^3/UL (ref 4–10.5)

## 2020-09-09 RX ADMIN — FERROUS SULFATE TAB 325 MG (65 MG ELEMENTAL FE) SCH MG: 325 (65 FE) TAB at 09:09

## 2020-09-09 RX ADMIN — DOCUSATE SODIUM SCH MG: 100 CAPSULE, LIQUID FILLED ORAL at 18:13

## 2020-09-09 RX ADMIN — ACETAMINOPHEN AND CODEINE PHOSPHATE PRN EACH: 300; 30 TABLET ORAL at 10:06

## 2020-09-09 RX ADMIN — FAMOTIDINE SCH MG: 20 TABLET, FILM COATED ORAL at 21:23

## 2020-09-09 RX ADMIN — DOCUSATE SODIUM SCH MG: 100 CAPSULE, LIQUID FILLED ORAL at 09:09

## 2020-09-09 RX ADMIN — IBUPROFEN SCH: 800 TABLET, FILM COATED ORAL at 21:23

## 2020-09-09 RX ADMIN — IBUPROFEN SCH MG: 800 TABLET, FILM COATED ORAL at 06:24

## 2020-09-09 RX ADMIN — FERROUS SULFATE TAB 325 MG (65 MG ELEMENTAL FE) SCH MG: 325 (65 FE) TAB at 18:13

## 2020-09-09 RX ADMIN — Medication SCH CAP: at 09:09

## 2020-09-09 RX ADMIN — SENNOSIDES, DOCUSATE SODIUM SCH EACH: 50; 8.6 TABLET, FILM COATED ORAL at 09:09

## 2020-09-09 RX ADMIN — ACETAMINOPHEN AND CODEINE PHOSPHATE PRN EACH: 300; 30 TABLET ORAL at 21:26

## 2020-09-09 RX ADMIN — FAMOTIDINE SCH MG: 20 TABLET, FILM COATED ORAL at 09:09

## 2020-09-09 RX ADMIN — IBUPROFEN SCH MG: 800 TABLET, FILM COATED ORAL at 14:35

## 2020-09-09 NOTE — PDOC PROGRESS REPORT
Subjective-OB


Progress Note for:: 20


Subjective: 





20yo  s/p  vaginal delivery ppd1. Pt ambulating, voiding and passing gas 

without difficulty. Reports pain well controlled with medication. No concerns 

today





Physical Exam (OB)


Vital Signs: 


                                        











Temp Pulse Resp BP Pulse Ox


 


 97.6 F   80   18   136/82 H  98 


 


 20 07:42  20 07:42  20 07:42  20 07:42  20 07:42








                                 Intake & Output











 09/08/20 09/09/20 09/10/20





 06:59 06:59 06:59


 


Intake Total  750 400


 


Balance  750 400


 


Weight 113 kg  














- General


General Appearance: Appears well


In distress: None





- PIH/Pre-Eclampsia


Clonus: Negative


Headache: Absent


Epigastric Pain: No


Visual Changes: No





- Maternal Morbidity


59. Maternal Morbidity (serious complications experinced by the mother 

associated with labor and delivery: None of the above





- Episiotomy/Laceration


Site Condition: Well Approximated





- Lochia


Lochia Amount: Small 10-25 ml


Lochia Color: Rubra/Red





- Abdomen


Description: Soft


Hernia Present: No


Fundal Description: Firm, Midline


Fundal Height: u/u - u/2





- Respiratory


Respiratory Status: No respiratory distress





- Extremities


Upper extremity: Normal inspection


Lower extremities: Normal inspection





- Neurological


Cognition: Normal


Orientation: AAOx4





- Psychological


Associated symptoms: Normal affect, Normal mood





Objective-Diagnostic


Laboratory: 


                                        





                                 20 07:37 





                                        











  20





  07:37


 


WBC  13.2 H


 


RBC  4.46


 


Hgb  12.5


 


Hct  36.6


 


MCV  82


 


MCH  28.0


 


MCHC  34.2


 


RDW  14.2 H


 


Plt Count  221














Assessment and Plan(PN)





- Assessment and Plan


(1) Vaginal delivery


Is this a current diagnosis for this admission?: Yes   


Plan: 


routine pp care 








(2) Obstetric vaginal laceration


Qualifiers: 


   Perineal laceration presence: unspecified whether perineal laceration present

  Qualified Code(s): O71.4 - Obstetric high vaginal laceration alone   


Is this a current diagnosis for this admission?: Yes   


Plan: 


 routine pp care, continue to monitor for s/s of  infection 








- Time Spent with Patient


Time with patient: Less than 15 minutes


Medications reviewed and adjusted accordingly: Yes





- Disposition


Anticipated Discharge Disposition: Home, Self Care


Anticipated Discharge Timeframe: within 24 hours

## 2020-09-10 VITALS — SYSTOLIC BLOOD PRESSURE: 138 MMHG | DIASTOLIC BLOOD PRESSURE: 78 MMHG

## 2020-09-10 RX ADMIN — IBUPROFEN SCH MG: 800 TABLET, FILM COATED ORAL at 13:59

## 2020-09-10 RX ADMIN — Medication SCH CAP: at 09:55

## 2020-09-10 RX ADMIN — DOCUSATE SODIUM SCH MG: 100 CAPSULE, LIQUID FILLED ORAL at 09:55

## 2020-09-10 RX ADMIN — FERROUS SULFATE TAB 325 MG (65 MG ELEMENTAL FE) SCH MG: 325 (65 FE) TAB at 09:55

## 2020-09-10 RX ADMIN — SENNOSIDES, DOCUSATE SODIUM SCH EACH: 50; 8.6 TABLET, FILM COATED ORAL at 09:55

## 2020-09-10 RX ADMIN — FAMOTIDINE SCH MG: 20 TABLET, FILM COATED ORAL at 09:55

## 2020-09-10 RX ADMIN — IBUPROFEN SCH MG: 800 TABLET, FILM COATED ORAL at 05:25

## 2020-09-10 NOTE — PDOC DISCHARGE SUMMARY
Impression





- Admit/DC Date/PCP


Admission Date/Primary Care Provider: 


  09/07/20 15:10





  ALCIRA LIND MD





Discharge Date: 09/10/20





- Discharge Diagnosis


(1) Obstetric vaginal laceration


Is this a current diagnosis for this admission?: Yes   





(2) Vaginal delivery


Is this a current diagnosis for this admission?: Yes   





- Additional Information


Discharge Diet: Regular


Discharge Activity: Balance Activity w/Rest, Pelvic Rest


Referrals: 


ALCIRA LIND MD [Primary Care Provider] - 


Prescriptions: 


Butalb/Acetaminophen/Caffeine [Fioricet (-40 mg) Tablet] 1 tab PO Q4H #20 

tab


Ibuprofen [Motrin 800 mg Tablet] 800 mg PO Q8HP PRN #60 tablet


 PRN Reason: 


Home Medications: 








Butalb/Acetaminophen/Caffeine [Gwtqge-Xmyfagab-Jtei -40] 1 tab PO PRN PRN 

08/22/20 


Prenatal 95/Iron Fum/Folic/Dha [Prenatal + Dha Combo Pack] 1 tab PO DAILY 

08/22/20 


Butalb/Acetaminophen/Caffeine [Fioricet (-40 mg) Tablet] 1 tab PO Q4H #20 

tab 09/10/20 


Ibuprofen [Motrin 800 mg Tablet] 800 mg PO Q8HP PRN #60 tablet 09/10/20 











Hospital Course


59. Maternal Morbidity (serious complications experinced by the mother 

associated with labor and delivery: None of the above





Results


Laboratory Results: 


                                        











WBC  13.2 10^3/uL (4.0-10.5)  H  09/09/20  07:37    


 


RBC  4.46 10^6/uL (3.72-5.28)   09/09/20  07:37    


 


Hgb  12.5 g/dL (12.0-15.5)   09/09/20  07:37    


 


Hct  36.6 % (36.0-47.0)   09/09/20  07:37    


 


MCV  82 fl (80-97)   09/09/20  07:37    


 


MCH  28.0 pg (27.0-33.4)   09/09/20  07:37    


 


MCHC  34.2 g/dL (32.0-36.0)   09/09/20  07:37    


 


RDW  14.2 % (11.5-14.0)  H  09/09/20  07:37    


 


Plt Count  221 10^3/uL (150-450)   09/09/20  07:37    


 


Lymph % (Auto)  10.3 % (13-45)  L  09/07/20  15:50    


 


Mono % (Auto)  8.4 % (3-13)   09/07/20  15:50    


 


Eos % (Auto)  0.5 % (0-6)   09/07/20  15:50    


 


Baso % (Auto)  0.7 % (0-2)   09/07/20  15:50    


 


Absolute Neuts (auto)  10.1 10^3/uL (1.7-8.2)  H  09/07/20  15:50    


 


Absolute Lymphs (auto)  1.3 10^3/uL (0.5-4.7)   09/07/20  15:50    


 


Absolute Monos (auto)  1.1 10^3/uL (0.1-1.4)   09/07/20  15:50    


 


Absolute Eos (auto)  0.1 10^3/uL (0.0-0.6)   09/07/20  15:50    


 


Absolute Basos (auto)  0.1 10^3/uL (0.0-0.2)   09/07/20  15:50    


 


Seg Neutrophils %  80.1 % (42-78)  H  09/07/20  15:50    


 


Urine Color  YELLOW   09/07/20  14:16    


 


Urine Appearance  CLEAR   09/07/20  14:16    


 


Urine pH  7.0  (5.0-9.0)   09/07/20  14:16    


 


Ur Specific Gravity  1.015   09/07/20  14:16    


 


Urine Protein  NEGATIVE mg/dL (NEGATIVE)   09/07/20  14:16    


 


Urine Glucose (UA)  NEGATIVE mg/dL (NEGATIVE)   09/07/20  14:16    


 


Urine Ketones  NEGATIVE mg/dL (NEGATIVE)   09/07/20  14:16    


 


Urine Blood  SMALL  (NEGATIVE)  H  09/07/20  14:16    


 


Urine Nitrite  NEGATIVE  (NEGATIVE)   09/07/20  14:16    


 


Urine Bilirubin  NEGATIVE  (NEGATIVE)   09/07/20  14:16    


 


Urine Urobilinogen  NEGATIVE mg/dL (<2.0)   09/07/20  14:16    


 


Ur Leukocyte Esterase  SMALL  (NEGATIVE)  H  09/07/20  14:16    


 


Urine Ascorbic Acid  NEGATIVE  (NEGATIVE)   09/07/20  14:16    


 


Fetal Membranes Rupture  POSITIVE  (NEGATIVE)  H  09/07/20  14:36    


 


Urine Opiates Screen  NEGATIVE   09/07/20  14:16    


 


Urine Methadone Screen  NEGATIVE   09/07/20  14:16    


 


Ur Barbiturates Screen  UNCONFIRMED POSITIVE   09/07/20  14:16    


 


Ur Phencyclidine Scrn  NEGATIVE   09/07/20  14:16    


 


Ur Amphetamines Screen  NEGATIVE   09/07/20  14:16    


 


U Benzodiazepines Scrn  NEGATIVE   09/07/20  14:16    


 


Urine Cocaine Screen  NEGATIVE   09/07/20  14:16    


 


U Marijuana (THC) Screen  NEGATIVE   09/07/20  14:16    


 


RPR  NONREACTIVE  (NONREACTIVE)   09/07/20  15:50    


 


Blood Type  O POSITIVE   09/07/20  15:50    


 


Antibody Screen  NEGATIVE   09/07/20  15:50    














Plan


Plan of Treatment: 


follow up in 4 weeks at [facility] for post partum check

## 2021-01-07 ENCOUNTER — HOSPITAL ENCOUNTER (EMERGENCY)
Dept: HOSPITAL 62 - ER | Age: 21
Discharge: LEFT BEFORE DISCHARGE | End: 2021-01-07
Payer: OTHER GOVERNMENT

## 2021-01-07 DIAGNOSIS — Z53.21: Primary | ICD-10-CM
